# Patient Record
Sex: MALE | Race: WHITE
[De-identification: names, ages, dates, MRNs, and addresses within clinical notes are randomized per-mention and may not be internally consistent; named-entity substitution may affect disease eponyms.]

---

## 2017-09-11 ENCOUNTER — HOSPITAL ENCOUNTER (OUTPATIENT)
Dept: HOSPITAL 62 - OD | Age: 42
End: 2017-09-11
Attending: PHYSICIAN ASSISTANT
Payer: COMMERCIAL

## 2017-09-11 DIAGNOSIS — M25.571: Primary | ICD-10-CM

## 2017-09-11 NOTE — RADIOLOGY REPORT (SQ)
EXAM DESCRIPTION:  ANKLE RIGHT COMPLETE



COMPLETED DATE/TIME:  9/11/2017 10:53 am



REASON FOR STUDY:  PAIN IN RIGHT ANKLE AND JOINTS OF RIGHT FOOT M25.571  PAIN IN RIGHT ANKLE AND JOIN
TS OF RIGHT FOOT



COMPARISON:  None.



NUMBER OF VIEWS:  Three views.



TECHNIQUE:  AP, lateral, and oblique radiographic images acquired of the right ankle.



LIMITATIONS:  None.



FINDINGS:  MINERALIZATION: Normal.

BONES: No acute fracture or dislocation.  No worrisome bone lesions.

JOINTS: No effusions.

SOFT TISSUES: No soft tissue swelling. No foreign body.

OTHER: No other significant finding.



IMPRESSION:  NEGATIVE STUDY OF THE RIGHT ANKLE. NO RADIOGRAPHIC EVIDENCE OF ACUTE INJURY.



TECHNICAL DOCUMENTATION:  JOB ID:  [id number]

 2011 [email or web address]- All Rights Reserved

## 2018-01-09 ENCOUNTER — HOSPITAL ENCOUNTER (EMERGENCY)
Dept: HOSPITAL 62 - ER | Age: 43
Discharge: HOME | End: 2018-01-09
Payer: COMMERCIAL

## 2018-01-09 VITALS — SYSTOLIC BLOOD PRESSURE: 116 MMHG | DIASTOLIC BLOOD PRESSURE: 77 MMHG

## 2018-01-09 DIAGNOSIS — S93.402A: Primary | ICD-10-CM

## 2018-01-09 DIAGNOSIS — S82.112A: ICD-10-CM
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
## 2018-01-09 PROCEDURE — 2W3RX1Z IMMOBILIZATION OF LEFT LOWER LEG USING SPLINT: ICD-10-PCS

## 2018-01-09 PROCEDURE — 99283 EMERGENCY DEPT VISIT LOW MDM: CPT

## 2018-01-09 NOTE — RADIOLOGY REPORT (SQ)
EXAM DESCRIPTION:  FOOT LEFT COMPLETE



COMPLETED DATE/TIME:  1/9/2018 3:24 pm



REASON FOR STUDY:  injury



COMPARISON:  Left ankle films same date



NUMBER OF VIEWS:  Three views.



TECHNIQUE:  AP, lateral and oblique  radiographic images acquired of the left foot.



LIMITATIONS:  None.



FINDINGS:  MINERALIZATION: Normal.

BONES: Irregularity of the bony cortex of the tarsal navicular.  Question fracture.  CT of the left f
oot may be useful for followup.

JOINTS: No effusions.

SOFT TISSUES: Dorsal soft tissue swelling.  No foreign body.

OTHER: No other significant finding.



IMPRESSION:  Question acute fracture of the tarsal navicular bone.  Consider CT for followup



TECHNICAL DOCUMENTATION:  JOB ID:  [id number]

 2011 [employer]- All Rights Reserved

## 2018-01-09 NOTE — ER DOCUMENT REPORT
HPI





- HPI


Patient complains to provider of: Left ankle sprain


Onset: Yesterday - Last evening


Quality of pain: Throbbing


Pain Level: 5


Context: 





42-year-old male was trying to put garbage into the can the end of the driveway 

and he inverted his left ankle.  It is painful and swollen and hurts to walk on 

it he is using crutches that he had at home.


Associated Symptoms: None


Exacerbated by: Movement, Walking


Relieved by: Denies


Similar symptoms previously: No


Recently seen / treated by doctor: No





- ROS


ROS below otherwise negative: Yes


Systems Reviewed and Negative: Yes All other systems reviewed and negative





Past Medical History





- General


Information source: Patient





- Social History


Smoking Status: Unknown if Ever Smoked


Frequency of alcohol use: None


Drug Abuse: None


Lives with: Family


Family History: Reviewed & Not Pertinent





- Medical History


Medical History: Negative


Surgical Hx: Negative





Vertical Provider Document





- CONSTITUTIONAL


Agree With Documented VS: Yes


Exam Limitations: No Limitations





- INFECTION CONTROL


TRAVEL OUTSIDE OF THE [place] IN LAST 30 DAYS: No





- HEENT


HEENT: Atraumatic, Normocephalic





- NECK


Neck: Supple





- RESPIRATORY


O2 Sat by Pulse Oximetry: 97





- MUSCULOSKELETAL/EXTREMETIES


Musculoskeletal/Extremeties: Tender, Edema - Medial and lateral malleolus, 2+ 

DP.  negative: FROM - limits due to pain





- NEURO


Level of Consciousness: Awake, Alert, Appropriate


Motor/Sensory: No Motor Deficit, No Sensory Deficit





- DERM


Integumentary: Warm, Dry, No Rash





Course





- Re-evaluation


Re-evalutation: 





01/09/18 16:43


CT shows fracture of the left posterior tibia.  I explained this to the patient 

we will put a posterior ankle splint and adjust his crutches so that they fit 

appropriately refer him to orthopedics and prescribe him a few hydrocodone for 

pain.  He states he does not need a work note











- Vital Signs


Vital signs: 


 











Temp Pulse Resp BP Pulse Ox


 


 98.3 F   90   16   116/77   97 


 


 01/09/18 14:05  01/09/18 14:05  01/09/18 14:05  01/09/18 14:05  01/09/18 14:05














Procedures





- Immobilization


  ** Left Ankle


Time completed: 17:30


Pre-Proc Neuro Vasc Exam: Normal


Immobilizer type: Posterior ankle


Performed by: NICK Don


Post-Proc Neuro Vasc Exam: Normal


Alignment checked and good: Yes





Discharge





- Discharge


Clinical Impression: 


 fx posterior left tibia





Condition: Good


Disposition: HOME, SELF-CARE


Instructions:  Use of Crutches (Critical [facility] hospital), Oral Narcotic Medication ([facility] hospital), Splint 

Pending Casting (Critical [facility] hospital), Splint Precautions ([facility] hospital), Fractured Tibia (Critical [facility] hospital)


Additional Instructions: 


non weight bearing


splint


call the orthopedic doctor tomorrow and schedule an appointment


Return to the emergency room any concerns





Please complete the patient satisfaction survey if you get one, and return it.. 

If you do not receive a survey,  then you can go to the [facility] hospital website, [email or web address] 

and place your comments about your very good care. Thank you very much. It was 

a pleasure being your medical provider today.


Prescriptions: 


Hydrocodone Bit/Acetaminophen [Hydrocodon-Acetaminophen 5-325] 1 each PO Q4HP 

PRN #15 tablet


 PRN Reason: 


Referrals: 


JENNY COOK MD [ACTIVE STAFF] - 01/10/18 (call tomorrow for appointment)

## 2018-01-09 NOTE — RADIOLOGY REPORT (SQ)
EXAM DESCRIPTION:  CT LT LOWER EXTREMITY WITHOUT



COMPLETED DATE/TIME:  1/9/2018 4:24 pm



REASON FOR STUDY:  ? tarsal navicular bone fx per stephanie



COMPARISON:  Left foot three views, left ankle three views same date



TECHNIQUE:  CT scan of the left foot performed without intravenous or oral contrast.  Images reviewed
 with soft tissue and bone windows.  Reconstructed coronal and sagittal MPR images reviewed.  All [place]
ges stored on PACS.

Additional 3D shaded surface display images were generated on an independent workstation

All CT scanners at this facility use dose modulation, iterative reconstruction, and/or weight based d
osing when appropriate to reduce radiation dose to as low as reasonably achievable (ALARA).

[facility]: Dose Right  [facility]: CareDose    [facility]: Dose Right    [facility]: Teradose 4D    [facility]: Smart Technologies



RADIATION DOSE:  CT Rad equipment meets quality standard of care and radiation dose reduction techniq
ues were employed. CTDIvol: 4.1 mGy. DLP: 143 mGy-cm. mGy.



LIMITATIONS:  None.



FINDINGS:  Normal bone density.

On axial image 42, sagittal reconstruction image 27, a nondisplaced acute fracture through the poster
ior articular surface distal tibia is present.  No widening of the distal tibiofibular joint.  Very s
ubtle widening of the medial ankle mortise.

Talus, calcaneus, tarsal bones are intact.  In particular, no acute navicular bone fracture is seen.

Remainder of the bones of foot are otherwise unremarkable.

There is diffuse soft tissue swelling around the ankle joint



IMPRESSION:  Acute nondisplaced fracture,  posterior articular surface distal tibia.

No other acute fractures are identified.  In particular, tarsal navicular bone is intact.



TECHNICAL DOCUMENTATION:  JOB ID:  [id number]

Quality ID # [id number]: Final reports with documentation of one or more dose reduction techniques (e.g., Au
tomated exposure control, adjustment of the mA and/or kV according to patient size, use of iterative 
reconstruction technique)

 2011 [employer]- All Rights Reserved

## 2018-01-09 NOTE — RADIOLOGY REPORT (SQ)
EXAM DESCRIPTION:  ANKLE LEFT COMPLETE



COMPLETED DATE/TIME:  1/9/2018 3:24 pm



REASON FOR STUDY:  injury



COMPARISON:  Left foot films same date



NUMBER OF VIEWS:  Three views.



TECHNIQUE:  AP, lateral, and oblique radiographic images acquired of the left ankle.



LIMITATIONS:  None.



FINDINGS:  MINERALIZATION: Normal.

BONES: Question acute fracture of the tarsal navicular.  Consider CT for followup.

Talus, distal fibula, distal tibia intact.

JOINTS: No gross ankle joint effusion.  Normal alignment at the ankle mortise

SOFT TISSUES: Mild dorsal midfoot soft tissue swelling.  No radiopaque foreign body or soft tissue ga
s

OTHER: No other significant finding.



IMPRESSION:  Question acute fracture of the tarsal navicular bone.  Consider CT of the left foot for 
followup



TECHNICAL DOCUMENTATION:  JOB ID:  [id number]

 2011 [employer]- All Rights Reserved

## 2018-10-07 ENCOUNTER — HOSPITAL ENCOUNTER (OUTPATIENT)
Dept: HOSPITAL 62 - RAD | Age: 43
End: 2018-10-07
Payer: OTHER GOVERNMENT

## 2018-10-07 DIAGNOSIS — M79.645: Primary | ICD-10-CM

## 2018-10-07 NOTE — RADIOLOGY REPORT (SQ)
EXAM DESCRIPTION:  FINGER LEFT



COMPLETED DATE/TIME:  10/7/2018 1:59 pm



REASON FOR STUDY:  PAIN OF LEFT THUMB fell, injury pain



COMPARISON:  None.



NUMBER OF VIEWS:  Three views.



TECHNIQUE:  AP, lateral, and oblique images acquired of the left thumb.



LIMITATIONS:  None.



FINDINGS:  MINERALIZATION: Normal.  Benign bone islands in the distal and proximal phalanges left thu
mb

BONES: No acute fracture or dislocation.  No worrisome bone lesions.

SOFT TISSUES: No soft tissue swelling.  No foreign body.

OTHER: No other significant finding.



IMPRESSION:  NO RADIOGRAPHIC EVIDENCE OF ACUTE INJURY.



COMMENT:   SITE OF TRAUMA/COMPLAINT MARKED/STAMP COMPLETED: Yes



TECHNICAL DOCUMENTATION:  JOB ID:  [id number]

 2011 [employer]- All Rights Reserved



Reading location - IP/workstation name: [email or web address]